# Patient Record
Sex: FEMALE
[De-identification: names, ages, dates, MRNs, and addresses within clinical notes are randomized per-mention and may not be internally consistent; named-entity substitution may affect disease eponyms.]

---

## 2021-01-05 ENCOUNTER — DASHBOARD ENCOUNTERS (OUTPATIENT)
Age: 51
End: 2021-01-05

## 2021-01-05 ENCOUNTER — OFFICE VISIT (OUTPATIENT)
Dept: URBAN - METROPOLITAN AREA TELEHEALTH 2 | Facility: TELEHEALTH | Age: 51
End: 2021-01-05
Payer: COMMERCIAL

## 2021-01-05 DIAGNOSIS — K44.9 HIATAL HERNIA: ICD-10-CM

## 2021-01-05 DIAGNOSIS — R10.84 GENERALIZED ABDOMINAL PAIN: ICD-10-CM

## 2021-01-05 DIAGNOSIS — D50.8 OTHER IRON DEFICIENCY ANEMIA: ICD-10-CM

## 2021-01-05 DIAGNOSIS — K92.2 GIB (GASTROINTESTINAL BLEEDING): ICD-10-CM

## 2021-01-05 PROBLEM — 87522002 IRON DEFICIENCY ANEMIA: Status: ACTIVE | Noted: 2021-01-05

## 2021-01-05 PROCEDURE — 99244 OFF/OP CNSLTJ NEW/EST MOD 40: CPT | Performed by: INTERNAL MEDICINE

## 2021-01-05 PROCEDURE — 99204 OFFICE O/P NEW MOD 45 MIN: CPT | Performed by: INTERNAL MEDICINE

## 2021-01-05 NOTE — HPI-OTHER HISTORIES
Telehealth  Asked by Dr. Roblero to see patient for :   Abd pain x1y Location LUQ Quality stabbing Frequency intermittent Radiation none Severity mild  Exacerbating factors none obvious Relieving factors none obvious   Assoc with mild dysphagia, dyspepsia, melena, coffee ground, dyspnea, diaphroesis, fatigue  seen in ER, told Hgb =6, given PPI and Iron   Denies diarrhea constipation hematochezia jaundice unintentional wt loss   Denies odynophagia food impaction CP cough anorexia light headedness   Denies scleral icterus, increased abd girth, LE edema, confusion, disorientation, memory loss  Prior EGD/colon, PillCam EGD = hiatal hernia, PUD Colon = few polyps, removed Pillcam = negative

## 2021-06-09 ENCOUNTER — WART (OUTPATIENT)
Dept: URBAN - METROPOLITAN AREA CLINIC 31 | Facility: CLINIC | Age: 51
Setting detail: DERMATOLOGY
End: 2021-06-09

## 2021-06-09 DIAGNOSIS — D22.5 MELANOCYTIC NEVI OF TRUNK: ICD-10-CM

## 2021-06-09 PROCEDURE — 17110 DESTRUCT B9 LESION 1-14: CPT

## 2021-10-18 ENCOUNTER — OFFICE VISIT (OUTPATIENT)
Dept: URBAN - METROPOLITAN AREA CLINIC 124 | Facility: CLINIC | Age: 51
End: 2021-10-18

## 2021-10-18 NOTE — HPI-TODAY'S VISIT:
The patient was referred by Dr Middleton for EGD consult. A copy of this document is being forwarded to the referring provider.   s/p  laparoscopic hiatal hernia repair with Jan fundoplication on 5/20/2021 and had an unremarkable postoperative recovery.  Denies any GERD sx aside from some belching.  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

## 2021-12-16 ENCOUNTER — COSMETIC QUESTIONS (OUTPATIENT)
Dept: URBAN - METROPOLITAN AREA CLINIC 36 | Facility: CLINIC | Age: 51
Setting detail: DERMATOLOGY
End: 2021-12-16

## 2021-12-16 DIAGNOSIS — B07.8 OTHER VIRAL WARTS: ICD-10-CM

## 2021-12-16 PROBLEM — L82.1 OTHER SEBORRHEIC KERATOSIS: Status: RESOLVED | Noted: 2021-12-16

## 2021-12-16 PROBLEM — L82.1 OTHER SEBORRHEIC KERATOSIS: Status: ACTIVE | Noted: 2021-12-16

## 2021-12-16 PROCEDURE — OTHER COSMETIC CONSULTATION: OTHER

## 2022-01-04 ENCOUNTER — IPL - FACE (BH) (OUTPATIENT)
Dept: URBAN - METROPOLITAN AREA CLINIC 30 | Facility: CLINIC | Age: 52
Setting detail: DERMATOLOGY
End: 2022-01-04

## 2022-01-04 DIAGNOSIS — D22.5 MELANOCYTIC NEVI OF TRUNK: ICD-10-CM

## 2022-01-04 DIAGNOSIS — L82.1 OTHER SEBORRHEIC KERATOSIS: ICD-10-CM

## 2022-01-04 PROCEDURE — OTHER IPL LASER: OTHER
